# Patient Record
Sex: MALE | Race: BLACK OR AFRICAN AMERICAN | NOT HISPANIC OR LATINO | URBAN - METROPOLITAN AREA
[De-identification: names, ages, dates, MRNs, and addresses within clinical notes are randomized per-mention and may not be internally consistent; named-entity substitution may affect disease eponyms.]

---

## 2017-03-13 ENCOUNTER — TELEPHONE (OUTPATIENT)
Dept: BEHAVIORAL HEALTH | Facility: CLINIC | Age: 57
End: 2017-03-13

## 2017-03-13 NOTE — TELEPHONE ENCOUNTER
3/13/17 Received Rule 25 Assessment from Will Saavedra   ([phone]) referring to [facility]. Left VM for Will,   regarding, insurance and update demographic. Filed until   Information is updated. CAROLIN

## 2017-04-20 ENCOUNTER — OFFICE VISIT - HEALTHEAST (OUTPATIENT)
Dept: ADDICTION MEDICINE | Facility: CLINIC | Age: 57
End: 2017-04-20

## 2017-04-20 DIAGNOSIS — F11.20 OPIOID USE DISORDER, SEVERE, DEPENDENCE (H): ICD-10-CM

## 2017-04-20 DIAGNOSIS — F14.20 COCAINE USE DISORDER, SEVERE, DEPENDENCE (H): ICD-10-CM

## 2017-04-24 ENCOUNTER — OFFICE VISIT - HEALTHEAST (OUTPATIENT)
Dept: ADDICTION MEDICINE | Facility: CLINIC | Age: 57
End: 2017-04-24

## 2017-04-24 DIAGNOSIS — F11.20 OPIOID USE DISORDER, SEVERE, DEPENDENCE (H): ICD-10-CM

## 2017-04-25 ENCOUNTER — OFFICE VISIT - HEALTHEAST (OUTPATIENT)
Dept: ADDICTION MEDICINE | Facility: CLINIC | Age: 57
End: 2017-04-25

## 2017-04-25 DIAGNOSIS — F11.20 OPIOID USE DISORDER, SEVERE, DEPENDENCE (H): ICD-10-CM

## 2017-04-26 ENCOUNTER — AMBULATORY - HEALTHEAST (OUTPATIENT)
Dept: ADDICTION MEDICINE | Facility: CLINIC | Age: 57
End: 2017-04-26

## 2017-04-26 ENCOUNTER — OFFICE VISIT - HEALTHEAST (OUTPATIENT)
Dept: ADDICTION MEDICINE | Facility: CLINIC | Age: 57
End: 2017-04-26

## 2017-04-26 DIAGNOSIS — F11.20 OPIOID USE DISORDER, SEVERE, DEPENDENCE (H): ICD-10-CM

## 2017-05-01 ENCOUNTER — OFFICE VISIT - HEALTHEAST (OUTPATIENT)
Dept: BEHAVIORAL HEALTH | Facility: CLINIC | Age: 57
End: 2017-05-01

## 2017-05-01 DIAGNOSIS — I10 ESSENTIAL HYPERTENSION: ICD-10-CM

## 2017-05-01 DIAGNOSIS — F11.20 OPIOID USE DISORDER, SEVERE, DEPENDENCE (H): ICD-10-CM

## 2017-05-01 ASSESSMENT — MIFFLIN-ST. JEOR: SCORE: 1298.88

## 2017-05-02 ENCOUNTER — AMBULATORY - HEALTHEAST (OUTPATIENT)
Dept: ADDICTION MEDICINE | Facility: CLINIC | Age: 57
End: 2017-05-02

## 2017-05-02 ENCOUNTER — OFFICE VISIT - HEALTHEAST (OUTPATIENT)
Dept: ADDICTION MEDICINE | Facility: CLINIC | Age: 57
End: 2017-05-02

## 2017-05-02 DIAGNOSIS — F11.20 OPIOID USE DISORDER, SEVERE, DEPENDENCE (H): ICD-10-CM

## 2017-05-03 ENCOUNTER — OFFICE VISIT - HEALTHEAST (OUTPATIENT)
Dept: ADDICTION MEDICINE | Facility: CLINIC | Age: 57
End: 2017-05-03

## 2017-05-03 DIAGNOSIS — F11.20 OPIOID USE DISORDER, SEVERE, DEPENDENCE (H): ICD-10-CM

## 2017-05-04 ENCOUNTER — OFFICE VISIT - HEALTHEAST (OUTPATIENT)
Dept: ADDICTION MEDICINE | Facility: CLINIC | Age: 57
End: 2017-05-04

## 2017-05-04 DIAGNOSIS — F11.20 OPIOID USE DISORDER, SEVERE, DEPENDENCE (H): ICD-10-CM

## 2017-05-05 ENCOUNTER — AMBULATORY - HEALTHEAST (OUTPATIENT)
Dept: ADDICTION MEDICINE | Facility: CLINIC | Age: 57
End: 2017-05-05

## 2017-05-08 ENCOUNTER — AMBULATORY - HEALTHEAST (OUTPATIENT)
Dept: ADDICTION MEDICINE | Facility: CLINIC | Age: 57
End: 2017-05-08

## 2017-05-30 ENCOUNTER — AMBULATORY - HEALTHEAST (OUTPATIENT)
Dept: BEHAVIORAL HEALTH | Facility: CLINIC | Age: 57
End: 2017-05-30

## 2017-06-05 ENCOUNTER — COMMUNICATION - HEALTHEAST (OUTPATIENT)
Dept: BEHAVIORAL HEALTH | Facility: CLINIC | Age: 57
End: 2017-06-05

## 2017-06-06 ENCOUNTER — OFFICE VISIT - HEALTHEAST (OUTPATIENT)
Dept: BEHAVIORAL HEALTH | Facility: CLINIC | Age: 57
End: 2017-06-06

## 2017-06-06 DIAGNOSIS — F11.20 OPIOID USE DISORDER, SEVERE, DEPENDENCE (H): ICD-10-CM

## 2017-06-06 ASSESSMENT — MIFFLIN-ST. JEOR: SCORE: 1312.48

## 2018-01-15 ENCOUNTER — RECORDS - HEALTHEAST (OUTPATIENT)
Dept: ADMINISTRATIVE | Facility: OTHER | Age: 58
End: 2018-01-15

## 2018-01-16 ENCOUNTER — RECORDS - HEALTHEAST (OUTPATIENT)
Dept: ADMINISTRATIVE | Facility: OTHER | Age: 58
End: 2018-01-16

## 2018-01-19 ENCOUNTER — COMMUNICATION - HEALTHEAST (OUTPATIENT)
Dept: BEHAVIORAL HEALTH | Facility: CLINIC | Age: 58
End: 2018-01-19

## 2018-01-19 DIAGNOSIS — E11.9 DIABETES MELLITUS TYPE 2, INSULIN DEPENDENT (H): ICD-10-CM

## 2018-01-19 DIAGNOSIS — Z79.4 DIABETES MELLITUS TYPE 2, INSULIN DEPENDENT (H): ICD-10-CM

## 2018-01-19 DIAGNOSIS — F11.20 OPIOID USE DISORDER, SEVERE, DEPENDENCE (H): ICD-10-CM

## 2018-01-23 ENCOUNTER — HOSPITAL ENCOUNTER (OUTPATIENT)
Dept: CARDIOLOGY | Facility: CLINIC | Age: 58
Discharge: HOME | End: 2018-01-23

## 2018-01-23 ENCOUNTER — RECORDS - HEALTHEAST (OUTPATIENT)
Dept: ADMINISTRATIVE | Facility: OTHER | Age: 58
End: 2018-01-23

## 2018-01-23 ENCOUNTER — HOSPITAL ENCOUNTER (OUTPATIENT)
Dept: CARDIOLOGY | Facility: CLINIC | Age: 58
Discharge: HOME | End: 2018-01-23
Attending: INTERNAL MEDICINE

## 2018-01-23 DIAGNOSIS — R94.31 ABNORMAL EKG: ICD-10-CM

## 2018-01-23 LAB
CV STRESS CURRENT BP HE: NORMAL
CV STRESS CURRENT HR HE: 100
CV STRESS CURRENT HR HE: 108
CV STRESS CURRENT HR HE: 108
CV STRESS CURRENT HR HE: 110
CV STRESS CURRENT HR HE: 130
CV STRESS CURRENT HR HE: 134
CV STRESS CURRENT HR HE: 138
CV STRESS CURRENT HR HE: 140
CV STRESS CURRENT HR HE: 145
CV STRESS CURRENT HR HE: 88
CV STRESS CURRENT HR HE: 90
CV STRESS CURRENT HR HE: 91
CV STRESS CURRENT HR HE: 94
CV STRESS CURRENT HR HE: 95
CV STRESS CURRENT HR HE: 95
CV STRESS CURRENT HR HE: 98
CV STRESS CURRENT HR HE: 99
CV STRESS DEVIATION TIME HE: NORMAL
CV STRESS ECHO PERCENT HR HE: NORMAL
CV STRESS EXERCISE STAGE HE: NORMAL
CV STRESS FINAL RESTING BP HE: NORMAL
CV STRESS FINAL RESTING HR HE: 91
CV STRESS MAX HR HE: 146
CV STRESS MAX TREADMILL GRADE HE: 12
CV STRESS MAX TREADMILL SPEED HE: 2.5
CV STRESS PEAK DIA BP HE: NORMAL
CV STRESS PEAK SYS BP HE: NORMAL
CV STRESS PHASE HE: NORMAL
CV STRESS PROTOCOL HE: NORMAL
CV STRESS RESTING PT POSITION HE: NORMAL
CV STRESS RESTING PT POSITION HE: NORMAL
CV STRESS ST DEVIATION AMOUNT HE: NORMAL
CV STRESS ST DEVIATION ELEVATION HE: NORMAL
CV STRESS ST EVELATION AMOUNT HE: NORMAL
CV STRESS TEST TYPE HE: NORMAL
CV STRESS TOTAL STAGE TIME MIN 1 HE: NORMAL
ECHO EJECTION FRACTION ESTIMATED: 60 %
STRESS ECHO BASELINE BP: NORMAL
STRESS ECHO BASELINE HR: 88
STRESS ECHO CALCULATED PERCENT HR: 90 %
STRESS ECHO LAST STRESS BP: NORMAL
STRESS ECHO LAST STRESS HR: 145
STRESS ECHO POST ESTIMATED WORKLOAD: 5.2
STRESS ECHO POST EXERCISE DUR MIN: 3
STRESS ECHO POST EXERCISE DUR SEC: 30
STRESS ECHO TARGET HR: 139

## 2021-05-30 VITALS — WEIGHT: 125 LBS | BODY MASS INDEX: 20.83 KG/M2 | HEIGHT: 65 IN

## 2021-05-31 VITALS — WEIGHT: 128 LBS | HEIGHT: 65 IN | BODY MASS INDEX: 21.33 KG/M2

## 2021-06-10 NOTE — PROGRESS NOTES
Addiction Services - Initial Services Plan      Name:  Delfin Quiros       :  1960        MRN:  [id number]    Goal Methods   1.  Acceptance of chemical dependency and mental illness as a disease. A.  Comply with all med/psych recommendations  B.  Complete preliminary interviews  C.  Attend all program functions  D.  Attend all individual counseling sessions  E.  Read all assigned literature  F.  Complete psychological testing as assigned  G.  Particpate in any necessary consultations     2.  Acceptance of my need and ability to change A.  Complete written workbook assignments on MICD  B.  Participate in conferences (P.O., , family)  C.  Participate in 12 Step/support groups  D.  Actively participate in treatment planning and reviews  E.  Complete all assignments given or recommended on Treatment Plan  F.  Present Drug History/Peer Assessment with peer group  G.  Complete 10th Step Inventory daily   3.  Acceptance of staff recommendations as a means to my recovery A.  Participate in all interviews for Continuum of Care Plans  B.  Familiarize self with 12 Step Recovery Program and how this applies to your day-to-day behavior  C.  Demonstrate a working knowledge of AA steps of recovery  D.  Obtain a sponsor     Patient describes their immediate need:  Bus card for transportation  Are there any immediate Safety Needs such as (physical, stability, mobility):  None    Immediate Health Needs and Plan:    Follow diabetes protocol/medication    Vulnerable Adult:  No    [x] Continue Current Medications for: diabetes, high blood pressure, anxiety,suboxone and pain  [] Request Consult for:  [] Notify Attending Physician about:  [] Other:      Issues to be addressed in the first sessions:    Become acquainted with group norms and other group members.   Set up time to meet 1:1 with primary counselor.     Patient strengths and needs:  Strengths: history of sobriety, good at giving advice, good with kids, kind,  caring, sharing, compassionate.     Needs: self-awareness about people and the potential to be taken advantage of by them.     Plan for patient for time between intake and completion of the treatment plan:  Remain abstinent from any illicit substance/alcohol use, and start group on 4/24/17.   Participate in all treatment activities and assignments.     Staff Members' Titles authorized to Initiate Services are:    Director of Behavioral Service    Clinical Director of Chemical Dependency    Primary Counselor    MI/KVNG [place]. Counselor        Nursing Staff    Vulnerable Adult Review  [x] Review of the facility Abuse Prevention plan was reviewed with the patient  [x] No individual abuse plan is necessary  [] In addition to the facility Abuse Prevention plan, an Individual Abuse Plan will be put in place    I understand these goals to be the Treatment Goals of the Program, and I agree to the stated Methods in attempting to accomplish these goals.    Patient Signature:  _________________________Date:  4/20/2017      Staff Name/Title:  ANGEL Espana    Date:  4/20/2017  Time: 3:01 PM

## 2021-06-10 NOTE — PROGRESS NOTES
Correct pharmacy verified with patient and confirmed in snapshot? [x] yes []no    Medications Phoned  to Pharmacy [] yes [x]no  Name of Pharmacist:  List Medications, including dose, quantity and instructions      Medication Prescriptions given to patient   [] yes  [x] no   List the name of the drug the prescription was written for.      Medications ordered this visit were e-scribed.  Verified by order class [x] yes  [] no  Suboxone    Medication changes or discontinuations were communicated to patient's pharmacy:  [] yes  [x] no  Pharmacist Spoke With:     UA collected [x] yes  [] no    [place] Prescription Monitoring Program Reviewed? [x] yes  [] no    Referrals/Labs were made to: None    Completed Charge Capture?  [x] yes  [] no    Future appointment was made: [x] yes  [] no  5/15/17  Dictation completed at time of chart check: [] yes  [x] no    I have checked the documentation for today s encounters and the above information has been reviewed and completed.
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
## 2021-06-10 NOTE — PROGRESS NOTES
Weekly Progress Note  Delfin Quiros  1960  [id number]      D) Pt attended 3 groups  this week with 1absences. A) Staff facilitated groups and reviewed tx progress. Assessed for [facility]. R) No VAP needed at this time. Pt working on the following dimensions:  Dimension #1 - Withdrawal Potential -Risk 0, No concern.  Client recently complete inpatient treatment. Client reports his last use of crack and heroin was 3/29/17. Client denies any current withdrawal symptoms. Client shows no physical signs or symptoms of intoxication or withdrawal.   Dimension #2 - Biomedical - Risk 1, Mild concern.  Client reports health concerns including diabetes and Hepatitis C. Client does not report a PCP, but his medical concerns appear to be managed at this time. Client appears able to get his medical needs addressed and met as necessary.   Dimension #3 - Emotional , Behavioral and Cognitive - Risk 1, Mild concern.  Client has mental health diagnoses of anxiety and depression. Client does not currently have mental health services, but does have medications that he takes to manage his mental health. Client reportedly struggles with anger issues, but appears stable at this time. Client denies any current suicidal or homicidal thoughts or plans.   Dimension #4 - Treatment Resistance -Risk 0, No concern.  Client verbalizes a desire to be in treatment, and to get help in order to change. Client appears to be genuinely motivated at this time. Client was calm and cooperative throughout this intake.   Dimension #5 - Relapse Potential - Risk 3, Serious concern.  Client reports a number of treatments in his life, with 3 of those being in the last 5 years. This may indicate a lack of coping skills to prevent relapse outside of a controlled environment. Client reports that his longest period of sobriety was about 6.5 years, and so it appears that the client does possess some coping skills, but it is unclear how often the client applies these skills  in his daily life currently. Client's anger issues may indicate an increased risk of relapse at this time as the client past use may have been a result of his inability to cope with daily stress.   Dimension #6 - Recovery Environment - Risk 3, Serious concern.  Client reports that he is currently living with a long-time friend, as the client is otherwise homeless. Client reports that this friend is very supportive of him, and is also sober. Client reports that his friends and family are supportive of his recovery currently, but he does state that his old using friends could be a threat to his sobriety. Client denies any current legal involvement, but does report 1 past charge for possession. Client reports no current support group participation as he was just released from inpatient on this date, but states that he plans to get involved multiple times per week. Client does endorse some hobbies that he enjoys, but it does not appear that the client has been engaging in those activities when he was using prior to treatment. Client is also unemployed, and so appears to lack structured and meaningful activity to fill his time.  T) Patient educated on Relapse Patient has completed 9 of 84 program hours at this time. Projected discharge date is 7/20/17. Current discharge plan is TBD     ANGEL Pagan        Psycho-Educational Curriculum  Date Attended  Psycho-Educational Curriculum  Date Attended    Acceptance   Shame/Guilt     1st Step   Anger/Rage     Affirmations   Mental Health     Automatic Negative Thoughts   Anxiety     Cross Addiction   Co-Occurring Disorders     Stages of Change   Annmarie/Bipolar     Relapse   Trauma      Addictive Thoughts   Victim Identity     Coping Skills   Sober Structure     Relapse Prevention   Continuum of Care     Medical Aspects   Non-12 Step Support     Brain/Neurotransmitters   Priorities     Medication Compliance   Spirituality     DENZEL Alcohol/Drug Research   Weekend Planner    "  Physical Health   Educational Videos     Post Acute Withdrawal   1st Step     Pregnancy and Drug Use   2nd Step     Sexual Health   Assertive Communication     Short-Term/Long-Term Effects   My name is Milton UNGER    Relationships   Cross Addiction     Assertive Communication   God As We Understood Him     Boundaries   HBO Relapse     Codependence    HBO What Is Addiction     Defense Mechanisms    Medical Aspects 1     Family Roles   Medical Aspects 2     Goodbye Letter   [employer]: Stress     Intimacy   PBS Depression Out of the Shadows     Needs/Dealbreakers in Relationships   The Anonymous People    Socialization Skills   [place]     Feelings   Pb Scott \"Highjacked Brain\"    ABC Model of Emotion   Johnny Conn Humor in [place]    Grief and Loss   The Mindfulness Movie    Healthy vs. Unhealthy Feelings   Milton UNGER documentary     Meditation/Mindfulness       Overconfidence/Complacency       Resentments       Stress         "

## 2021-06-10 NOTE — PROGRESS NOTES
Intake Note:   D) Delfin Quiros is a 57 y.o.  single Black or  male who is referred to DOP via [facility] with funding from Medicare/[facility] Funding. Patient orientated x 3. Patient meets criteria for Opioid Use Disorder, Severe (F11.20), Stimulant Use Disorder, Cocaine, Severe (F14.20).   Patient appears appropriate for DOP.     A)Completed intake assessment; preliminary paperwork; counselor & supervisor license number and contact info., Patient Bill of Rights, , DOP rules/regulations, , Abuse Prevention Plan,, confidentiality & HIPPA policies, , grievance procedure,, presented ROIs, , TB & HIV/AIDS policies & resources, and Vulnerable Adult policy, .   Conducted Vulnerable Adult Assessment yes .     R)No special Vulnerable Adult needed at this time. .   Patient signed and agreed to counselor & supervisor license number and contact info., Patient Bill of Rights, , DOP rules/regulations, , Abuse Prevention Plan,, confidentiality & HIPPA policies, , grievance procedure,, presented ROIs, TB & HIV/AIDS policies & resources, and Vulnerable Adult policy. Patient scored lower risk on PANSI screen.   Dimension #1 - Withdrawal Potential -Risk 0, No concern.  Client recently complete inpatient treatment. Client reports his last use of crack and heroin was 3/29/17. Client denies any current withdrawal symptoms. Client shows no physical signs or symptoms of intoxication or withdrawal.   Dimension #2 - Biomedical - Risk 1, Mild concern.  Client reports health concerns including diabetes and Hepatitis C. Client does not report a PCP, but his medical concerns appear to be managed at this time. Client appears able to get his medical needs addressed and met as necessary.   Dimension #3 - Emotional , Behavioral and Cognitive - Risk 1, Mild concern.  Client has mental health diagnoses of anxiety and depression. Client does not currently have mental health services, but does have  medications that he takes to manage his mental health. Client reportedly struggles with anger issues, but appears stable at this time. Client denies any current suicidal or homicidal thoughts or plans.   Dimension #4 - Treatment Resistance -Risk 0, No concern.  Client verbalizes a desire to be in treatment, and to get help in order to change. Client appears to be genuinely motivated at this time. Client was calm and cooperative throughout this intake.   Dimension #5 - Relapse Potential - Risk 3, Serious concern.  Client reports a number of treatments in his life, with 3 of those being in the last 5 years. This may indicate a lack of coping skills to prevent relapse outside of a controlled environment. Client reports that his longest period of sobriety was about 6.5 years, and so it appears that the client does possess some coping skills, but it is unclear how often the client applies these skills in his daily life currently. Client's anger issues may indicate an increased risk of relapse at this time as the client past use may have been a result of his inability to cope with daily stress.   Dimension #6 - Recovery Environment - Risk 3, Serious concern.  Client reports that he is currently living with a long-time friend, as the client is otherwise homeless. Client reports that this friend is very supportive of him, and is also sober. Client reports that his friends and family are supportive of his recovery currently, but he does state that his old using friends could be a threat to his sobriety. Client denies any current legal involvement, but does report 1 past charge for possession. Client reports no current support group participation as he was just released from inpatient on this date, but states that he plans to get involved multiple times per week. Client does endorse some hobbies that he enjoys, but it does not appear that the client has been engaging in those activities when he was using prior to treatment.  Client is also unemployed, and so appears to lack structured and meaningful activity to fill his time.     T) Explained counselor & supervisor license number and contact info., Patient Bill of Rights, , DOP rules/regulations, , Abuse Prevention Plan,, confidentiality & HIPPA policies, , grievance procedure,, presented ROIs, , TB & HIV/AIDS policies & resources, and Vulnerable Adult policy.    Sherrell Fox [facility] WilliamsburgDANNIELLE  4/20/2017, 3:02 PM

## 2021-06-10 NOTE — PROGRESS NOTES
[facility]   Mental Health and Addiction Care   [facility], [facility], and [facility]    [phone] or [phone]   Master Plan   Client Name:  Delfin Quiros   MRN: [id number]    Counselor: ANGEL Pagan     Title:  Dimension #1 - Withdrawal Potential -Risk 0, No concern.      Plan Date:   4/26/2017  Diagnosis:     Patient Active Problem List   Diagnosis     Opioid use disorder, severe, dependence     Hypertension     Type 2 diabetes mellitus with diabetic polyneuropathy, with long-term current use of insulin     MDD (major depressive disorder), recurrent episode, moderate     Hepatitis C     Cocaine use disorder, mild, abuse     Pre-ulcerative calluses     Tobacco use disorder     Orthostatic hypotension     Opioid withdrawal     Severe opioid use disorder     Callus of foot     Hammer toes of both feet     Severe protein-calorie malnutrition     Nocturia     Liver mass      Problem:Client recently complete inpatient treatment. Client reports his last use of crack and heroin was 3/29/17. Client denies any current withdrawal symptoms. Client shows no physical signs or symptoms of intoxication or withdrawal.Client currently taking suboxone for withdrawal opiate replacement.     Goal: Begin Date: 4/26/2017 Target Date: 7/20/17  Maintain abstinence throughout  Outpatient Treatment in order to avoid experiencing withdrawal symptoms and to meet OP expectations.   Method 1: Begin Date:4/26/2017 Target Date: 7/20/17 Date Completed:   Attend OP groups as directed and share thoughts, feelings and urges to use, as well as sober supports with staff and peers in order to maintain awareness of details shaping your recovery process.     Title:  Dimension #2 - Biomedical - Risk 1, Mild concern.     Plan Date:   4/26/2017   Diagnosis:     Patient Active Problem List   Diagnosis     Opioid use disorder, severe, dependence     Hypertension     Type 2 diabetes mellitus with diabetic polyneuropathy, with  long-term current use of insulin     MDD (major depressive disorder), recurrent episode, moderate     Hepatitis C     Cocaine use disorder, mild, abuse     Pre-ulcerative calluses     Tobacco use disorder     Orthostatic hypotension     Opioid withdrawal     Severe opioid use disorder     Callus of foot     Hammer toes of both feet     Severe protein-calorie malnutrition     Nocturia     Liver mass      Problem:Client reports health concerns including diabetes and Hepatitis C. Client does not report a PCP, but his medical concerns appear to be managed at this time. Client appears able to get his medical needs addressed and met as necessary.    Goal: Begin Date: 4/26/2017 Target Date: 7/20/17  Practice living a healthy lifestyle on a daily basis with proper rest, nutrition and exercise. Client to make an appointment for a physical asap  Method 1: Begin Date:4/26/2017 Target Date: 7/20/17 Date Completed:   Continue to follow recommendations from your personal care provider regarding medical issues. Inform staff immediately of any changes in your health that may affect your active participation in group therapy or attendance.    Is Nicotine dependence indicated on the assessment? NO  Method 2: Begin Date:4/26/2017 Target Date: 7/20/17 Date Completed:  Staff will provide client with information on tobacco cessation,and tools for quitting.      Title: Dimension #3 - Emotional , Behavioral and Cognitive - Risk 1, Mild concern.     Plan Date:   4/26/2017   Diagnosis:     Patient Active Problem List   Diagnosis     Opioid use disorder, severe, dependence     Hypertension     Type 2 diabetes mellitus with diabetic polyneuropathy, with long-term current use of insulin     MDD (major depressive disorder), recurrent episode, moderate     Hepatitis C     Cocaine use disorder, mild, abuse     Pre-ulcerative calluses     Tobacco use disorder     Orthostatic hypotension     Opioid withdrawal     Severe opioid use disorder      Callus of foot     Hammer toes of both feet     Severe protein-calorie malnutrition     Nocturia     Liver mass      Problem:Client has mental health diagnoses of anxiety and depression. Client does not currently have mental health services, but does have medications that he takes to manage his mental health. Client reportedly struggles with anger issues, but appears stable at this time. Client denies any current suicidal or homicidal thoughts or plans.    Goal: Begin Date: 4/26/2017 Target Date: 7/20/17   To treat mental health effectively while attending treatment in order to increase your ability to meet goals and treatment expectations Make an appointment in the mental health clinic for a Diagnostic Assessment and follow up therapy and provider appointment for medication management.    Method 1: Begin Date:4/26/2017 Target Date: 7/20/17 Date Completed:   Begin to keep a daily schedule  for appointments , events of the day and schedule sober activities. . Reflecting on this daily to build continued sober support and wellbeing. . Report in group how this is beneficial.  Method 2: Begin Date:4/26/2017 Target Date: 7/20/17  Date Completed:   Identify 3 personal traits you like about yourself and 3 that you would like to see changes in. Develop a plan to initiate those changes. What will need to happen for these goals to be successful? Share this with counselor.        Title: Dimension #4 - Treatment Resistance -Risk 0, No concern.       Plan Date:   4/26/2017     Diagnosis:     Patient Active Problem List   Diagnosis     Opioid use disorder, severe, dependence     Hypertension     Type 2 diabetes mellitus with diabetic polyneuropathy, with long-term current use of insulin     MDD (major depressive disorder), recurrent episode, moderate     Hepatitis C     Cocaine use disorder, mild, abuse     Pre-ulcerative calluses     Tobacco use disorder     Orthostatic hypotension     Opioid withdrawal     Severe opioid use  "disorder     Callus of foot     Hammer toes of both feet     Severe protein-calorie malnutrition     Nocturia     Liver mass      Problem:Client verbalizes a desire to be in treatment, and to get help in order to change. Client appears to be genuinely motivated at this time.client has been through several treatment with some long term sobriety. Lacks impulse control and insight to remain abstinent.         Goal: Begin Date: 4/26/2017 Target Date: 7/20/17  To follow through with intentions to treat chemical dependency concerns while meeting OP treatment expectations in order to graduate successfully from our program.   Method 1: Begin Date:4/26/2017 Target Date: 6/1/17 Date Completed:   Complete 1st Step (Use History and Consequences) assignment while in Phase I of treatment and present to peers in group. Reflect on feedback received from peers and report findings to staff.   Method 2: Begin Date:4/26/2017 Target Date: 7/20/17  Date Completed:   Complete all written assignments as assigned by staff including your \"goodbye letter\" and \"relapse prevention plan\" prior to graduation.  Contact staff with questions or concerns about written and oral assignments while attending group therapy.  Method 3: Begin Date:4/26/2017 Target Date: 7/20/17 Date Completed:   If for any reason you cannot attend group therapy or you will be tardy, contact staff as soon as possible at [phone]. Three unexcused absences  is considered voluntary discharge from the outpatient program.       Title: Dimension #5 - Relapse Potential - Risk 3, Serious concern.    Plan Date:   4/26/2017   Diagnosis:     Patient Active Problem List   Diagnosis     Opioid use disorder, severe, dependence     Hypertension     Type 2 diabetes mellitus with diabetic polyneuropathy, with long-term current use of insulin     MDD (major depressive disorder), recurrent episode, moderate     Hepatitis C     Cocaine use disorder, mild, abuse     Pre-ulcerative calluses "     Tobacco use disorder     Orthostatic hypotension     Opioid withdrawal     Severe opioid use disorder     Callus of foot     Hammer toes of both feet     Severe protein-calorie malnutrition     Nocturia     Liver mass      Problem:Client reports that his longest period of sobriety was about 6.5 years, and so it appears that the client does possess some coping skills, but it is unclear how often the client applies these skills in his daily life currently. Client's anger issues may indicate an increased risk of relapse at this time as the client past use may have been a result of his inability to cope with daily stress. Client reports a number of treatments in his life, with 3 of those being in the last 5 years. This may indicate a lack of coping skills to prevent relapse outside of a controlled environment.     Goal: Begin Date: 4/26/2017Target Date: 7/20/17  To deal effectively with relapse triggers and stressors while building coping skills in order to handle life events without resorting to drug/alcohol use.   Method 1: Begin Date:4/26/2017 Target Date: 7/20/17 Date Completed:   Participate in OP group check-ins consistently as way of increasing self-awareness and connecting honestly with staff and peers.   Method 2: Begin Date:4/26/2017 Target Date: 7/20/17 Date Completed:   Develop a list of 7 triggers to your use and identify 10 coping skills you can use to arrest the urge to use. Submit to counselor when finished.    Method 3: Begin Date:4/26/2017 Target Date: 7/20/17Date Completed:   Report any relapses, if any, on any substances of abuse to staff immediately.        Title: Dimension #6 - Recovery Environment - Risk 3, Serious concern.    Plan Date:   4/26/2017   Diagnosis:     Patient Active Problem List   Diagnosis     Opioid use disorder, severe, dependence     Hypertension     Type 2 diabetes mellitus with diabetic polyneuropathy, with long-term current use of insulin     MDD (major depressive  disorder), recurrent episode, moderate     Hepatitis C     Cocaine use disorder, mild, abuse     Pre-ulcerative calluses     Tobacco use disorder     Orthostatic hypotension     Opioid withdrawal     Severe opioid use disorder     Callus of foot     Hammer toes of both feet     Severe protein-calorie malnutrition     Nocturia     Liver mass      Problem: Client reports that he is currently living with a long-time friend, as the client is otherwise homeless. Client reports that this friend is very supportive of him, and is also sober. Client reports that his friends and family are supportive of his recovery currently, but he does state that his old using friends could be a threat to his sobriety. Client denies any current legal involvement, but does report 1 past charge for possession. Client reports no current support group participation as he was just released from inpatient on this date, but states that he plans to get involved multiple times per week. Client does endorse some hobbies that he enjoys, but it does not appear that the client has been engaging in those activities when he was using prior to treatment. Client is also unemployed, and so appears to lack structured and meaningful activity to fill his time. Location of treatment here at [facility] may pose a risk to his recovery as he is assiociated with people in the area where use is abundant.     Goal: Begin Date: 4/26/2017 Target Date: 7/20/17  To build meaningful structure into your weekly schedule by attending specific recovery activities on a daily basis   Method 1: Begin Date:4/26/2017 Target Date: 7/20/17 Date Completed:   Find 2 sober support groups you feel comfortable attending on a weekly basis and inform counselor how these meetings are impacting you.Obtain a sponsor before 2nd phase of treatment.    Method 2: Begin Date:4/26/2017 Target Date: 7/20/17  Date Completed:   Develop a Bucket list. What activities would you like to take part in. Set  goals in intervals.  such as 3 month, 6 month,1 year, 3 years and 5 years, what barriers do you identify at this time for these goals to happen.   Method 3: Begin Date:4/26/2017 Target Date: 7/20/17  Date Completed:   Invite a family member or concerned other who is supportive of your recovery to meet for a family session with your primary counselor, in the effort to help them understand addiction and the causes and to gain knowledge of self care for themselves and for your recovery.    By signing this document, I am acknowledging that I was actively and directly involved in the development of my treatment plan.   I have been a participant in the creation of my individual treatment plan.       Client Signature_________________________________________         Date__________________         Staff Signature ANGEL Pagan

## 2021-06-10 NOTE — PROGRESS NOTES
Weekly Progress Note  Delfin Quiros  1960  [id number]      D) Pt attended 3 groups  this week with 1absences. There is some concern that client is using . A staff member reported seeing him involved in a drug deal on [address], 2 days ago. Client was presented yesterday with a request to submit a Ua to the lab before 1:30 pm on 5/4/17 however the client never presented to the lab. This writer called the client's phone at 12/pm today and left a message requesting that he come to the hospital to submit a UA today and a return phone call.    A) Staff facilitated groups and reviewed tx progress. Assessed for [facility]. R) No VAP needed at this time. Pt working on the following dimensions:  Dimension #1 - Withdrawal Potential -Risk 0, No concern.  Client recently complete inpatient treatment. Client reports his last use of crack and heroin was 3/29/17. Client denies any current withdrawal symptoms. Client shows no physical signs or symptoms of intoxication or withdrawal.   Dimension #2 - Biomedical - Risk 1, Mild concern.  Client reports health concerns including diabetes and Hepatitis C. Client does not report a PCP, but his medical concerns appear to be managed at this time. Client appears able to get his medical needs addressed and met as necessary.   Dimension #3 - Emotional , Behavioral and Cognitive - Risk 1, Mild concern.  Client has mental health diagnoses of anxiety and depression. Client does not currently have mental health services, but does have medications that he takes to manage his mental health. Client reportedly struggles with anger issues, but appears stable at this time. Client denies any current suicidal or homicidal thoughts or plans.   Dimension #4 - Treatment Resistance -Risk 0, No concern.  Client verbalizes a desire to be in treatment, and to get help in order to change. Client appears to be genuinely motivated at this time. Client was calm and cooperative throughout this intake.Client  appears to be very sedated while in group.    Dimension #5 - Relapse Potential - Risk 3, Serious concern.  Client reports a number of treatments in his life, with 3 of those being in the last 5 years. This may indicate a lack of coping skills to prevent relapse outside of a controlled environment. Client reports that his longest period of sobriety was about 6.5 years, and so it appears that the client does possess some coping skills, but it is unclear how often the client applies these skills in his daily life currently. Client's anger issues may indicate an increased risk of relapse at this time as the client past use may have been a result of his inability to cope with daily stress.   Dimension #6 - Recovery Environment - Risk 3, Serious concern.  Client reports that he is currently living with a long-time friend, as the client is otherwise homeless. Client reports that this friend is very supportive of him, and is also sober. Client reports that his friends and family are supportive of his recovery currently, but he does state that his old using friends could be a threat to his sobriety. Client denies any current legal involvement, but does report 1 past charge for possession. Client reports no current support group participation as he was just released from inpatient on this date, but states that he plans to get involved multiple times per week. Client does endorse some hobbies that he enjoys, but it does not appear that the client has been engaging in those activities when he was using prior to treatment. Client is also unemployed, and so appears to lack structured and meaningful activity to fill his time.  T) Patient educated on Relapse Patient has completed 18 of 84 program hours at this time. Projected discharge date is 7/20/17. Current discharge plan is ANGEL Gunderson        Psycho-Educational Curriculum  Date Attended  Psycho-Educational Curriculum  Date Attended    Acceptance   Shame/Guilt   "   1st Step   Anger/Rage     Affirmations   Mental Health     Automatic Negative Thoughts   Anxiety     Cross Addiction   Co-Occurring Disorders     Stages of Change   Annmarie/Bipolar     Relapse   Trauma      Addictive Thoughts   Victim Identity     Coping Skills   Sober Structure     Relapse Prevention   Continuum of Care     Medical Aspects   Non-12 Step Support     Brain/Neurotransmitters   Priorities     Medication Compliance   Spirituality     DENZEL Alcohol/Drug Research   Weekend Planner     Physical Health   Educational Videos     Post Acute Withdrawal   1st Step     Pregnancy and Drug Use   2nd Step     Sexual Health   Assertive Communication     Short-Term/Long-Term Effects   My name is Milton UNGER    Relationships   Cross Addiction     Assertive Communication   God As We Understood Him     Boundaries   HBO Relapse     Codependence    HBO What Is Addiction     Defense Mechanisms    Medical Aspects 1     Family Roles   Medical Aspects 2     Goodbye Letter   [employer]: Stress     Intimacy   PBS Depression Out of the Shadows     Needs/Dealbreakers in Relationships   The Anonymous People    Socialization Skills   [place]     Feelings   Pb Scott \"Highjacked Brain\"    ABC Model of Emotion   Johnny Conn Humor in [place]    Grief and Loss   The Mindfulness Movie    Healthy vs. Unhealthy Feelings   Milton UNGER documentary     Meditation/Mindfulness       Overconfidence/Complacency       Resentments       Stress         "

## 2021-06-10 NOTE — PROGRESS NOTES
Patient called the clinic to inform them that he is not able to make it to therapy today because he is admitted at [facility] at [address];which is addiction services area. No further comment was given.   Performed and [facility] 5/30/2017

## 2021-06-10 NOTE — PROGRESS NOTES
Delfin Quiros attended 3 hours of group therapy today. The patient was introduced to the group today.     4/25/2017 8:15 AM Deidra Coulter

## 2021-06-15 PROBLEM — M20.41 HAMMER TOES OF BOTH FEET: Status: ACTIVE | Noted: 2017-04-03

## 2021-06-15 PROBLEM — L84 CALLUS OF FOOT: Status: ACTIVE | Noted: 2017-04-03

## 2021-06-15 PROBLEM — E43 SEVERE PROTEIN-CALORIE MALNUTRITION (H): Status: ACTIVE | Noted: 2017-04-03

## 2021-06-15 PROBLEM — R35.1 NOCTURIA: Status: ACTIVE | Noted: 2017-04-07

## 2021-06-15 PROBLEM — M20.42 HAMMER TOES OF BOTH FEET: Status: ACTIVE | Noted: 2017-04-03

## 2021-07-04 NOTE — PROGRESS NOTES
"Rule 31 by Sherrell Fox LADC at 2017  3:00 PM     Author: Sherrell Fox LADC Service: Addiction Care Author Type: Licensed Alcohol and Drug Counselor    Filed: 2017  8:01 AM Encounter Date: 2017 Status: Signed    : Sherrell Fox LADC (Licensed Alcohol and Drug Counselor)         [facility] Assessment Summary  Date: 2017        : ANGEL Espana    Name: Delfin Quiros  Address: [address] [place] [address]    Phone: [phone] (home)   Referral Source: [facility]  : 1960  Age: 57 y.o.  Race/Ethnicity: Black or   Marital Status: Single  Employment: Unemployed                                                                                                                       Level of Education: GED                Socio-economic (yearly Income) Status: Social Security.   Sexual Orientation: Heterosexual       Last 4 digits of Social Security: 6010    Reason for seeking services    Client is here for treatment to \"get his life back.\" Client reports a history of sobriety in the past, and verbalizes a desire for support at this time.     Dimension I Acute intoxication/Withdrawal Potential:    Symptomology (past 12 months, check all that apply)  [x]Increased tolerance, [x]passing out, [x] binges, [x]AM use, [x]weekly intoxication, [] decreased tolerance, [] blackouts, []secretive use, [x]preoccupation, []protecting supply, [x] hurried ingestion, [x]medicinal use, []using alone, []repeated family or social problems, [x] mood swings, []loss of control, []family history of addiction    Observed or reported (withdrawal symptoms, check all that apply) Client denies all withdrawal symptoms.   []SWEATING (RAPID PULSE), [] NAUSEA/VOMITING, []SHAKY/JITTERY/TREMORS, []DIZZINESS, []UNABLE TO SLEEP, []SEIZURES, []AGITATION, [] DIARRHEA, []HEADACHE, []DIMINISHED APPETITE,[]FATIGUE/EXTREMELY TIRED, []HALLUCINATIONS, []SAD /DEPRESSED " FEELING, []FEVER,[]MUSCLE ACHES, []UNABLE TO EAT, []VIVID /UNPLEASANT DREAMS, []PSYCHOSIS, []IRRITABILITY, []CONFUSED/DISRUPTED SPEECH, []SENSITIVITY TO NOISE, [] ANXIETY/WORRIED,[]HIGH BLOOD PRESSURE          Chemical use most recent 12 months outside a facility and other significant use history (client self-report)  Primary Drug Used  Age of First Use  Most Recent Pattern of Use and Duration    Date of last use and time, if needed  Withdrawal Potential? Requiring special care  Method of use   (oral, smoked, snort, IV, etc)    Alcohol  16 2x per month- 1 drink each time.  3/25/17  oral   Marijuana/Hashish  12 A couple times in the past year- 2 hits each time.  3/29/17  smoking   Cocaine/Crack  18 Crack- 1/2 gram daily since 2016 3/29/17  smoking   Meth/Amphetamines   Denies      Heroin  17 1/2 gram daily since 2016 3/29/17  snorting   Other Opiates/Synthetics   Denies      Inhalants   Denies      Benzodiazepines   Denies      Hallucinogens   Denies      Barbiturates/Sedatives/Hypnotics   Denies      Over-the-Counter Drugs   Denies      Other   Denies      Nicotine  15 1/2- 1 pack daily 17  Smoking.        Dimension I Risk Ratin- No Concern.   Reason Risk Rating Assigned: Client recently complete inpatient treatment. Client reports his last use of crack and heroin was 3/29/17. Client denies any current withdrawal symptoms. Client shows no physical signs or symptoms of intoxication or withdrawal.     Dimension II Biomedical Conditions:    Any known health conditions: Yes[x]/No[]    Ever previously treated/diagnosed with any eating disorder?  YES []/[x] NO  Explain:NA    List Health Concerns/Conditions Reported: Diabetes, and Hepatitis C    Are Health Concerns/Conditions being treated? YES[]/NO [x]  By Whom? No PCP reported at this time.   Are you pregnant: Yes[]/No[x]  OB Care Received: Yes[]/No[x]       CPS Call needed: Yes[]/No[x]    Dimension II Risk Ratin- Mild Concern.   Reason  Risk Rating Assigned: Client reports health concerns including diabetes and Hepatitis C. Client does not report a PCP, but his medical concerns appear to be managed at this time. Client appears able to get his medical needs addressed and met as necessary.     Dimension III Emotional/Behavioral/Cognitive:    Oriented to person, place, time, situation? YES [x]/ NO []   Current Mental Health Services: YES []/ NO [x]  Past Hospitalization for MH or psychiatric problems: YES[] / NO[x]  How many Hospitalizations: 0   Last Hospitalization; date and location: NA      Past or Current Issues with Gambling (Explain): Denies.   Prior Treatment for Gambling: YES[] / NO[x]    MH Diagnoses:    Depression, anxiety.   Psychiatrist: NA     Clinic: NA    Current Psychotropic Medications:    amLODIPine (NORVASC) 10 MG tablet   aspirin 81 mg chewable tablet   buprenorphine-naloxone (SUBOXONE SL FILM) 8-2 mg Film per sublingual film   citalopram (CELEXA) 20 MG tablet   ferrous sulfate 325 (65 FE) MG tablet   gabapentin (NEURONTIN) 300 MG capsule   insulin aspart (NOVOLOG) 100 unit/mL injection   lisinopril (PRINIVIL,ZESTRIL) 40 MG tablet   multivitamin therapeutic (THERAGRAN) tablet   NOVOLOG FLEXPEN 100 unit/mL injection pen   QUEtiapine (SEROQUEL) 50 MG tablet   traZODone (DESYREL) 50 MG tablet         Taking medications as prescribed:  YES[x] / NO[]    Medications Helpful: YES[x] / NO[]    Current Suicidal Ideation: YES[] / NO[x]  If yes, any plan?  YES[] / NO[x]  What is plan?:   None    Previous Suicide Attempts?  YES[] / NO[x]  Explain: Denies.      Current Homicidal Ideation: YES[]/NO[x] If yes, any plan? YES[]/NO[x]  What is plan?: None    Previous Homicide Attempts? YES[]/NO[x]Explain: Denies    Suicidal/Homicidal Ideation in last 30 days? YES[]/NO [x] (Explain)  Denies.     Family history of substance and/or mental health diagnosis/issues?  YES[x]/NO [] (Explain)  Client reports that he has 2 brothers who have schizophrenia      History of abuse (Physical, Emotional, Sexual)? YES[]/NO [x] (Explain)  Client denies.       Dimension III Risk Ratin- Mild Concern.   Reason Risk Rating Assigned: Client has mental health diagnoses of anxiety and depression. Client does not currently have mental health services, but does have medications that he takes to manage his mental health. Client reportedly struggles with anger issues, but appears stable at this time. Client denies any current suicidal or homicidal thoughts or plans.     Dimension IV Readiness to Change:    Mandated, or coerced into assessment or treatment:  YES[] / NO  [x]  Does client feel there is a problem:  YES[x] / NO[]  Verbalization of need/desire to change:  YES [x]/ NO[]    Impression of : (Check all that apply):  [x]Cooperative, [x] genuinely motivated, []ambivalent about change, []minimal awareness, [] low motivation, []minimally cooperative, []non-compliant, []overtly hostile, []unwilling to explore change    Are there any spiritual, cultural, or other special needs to be addressed for client to be successful in treatment? Yes[]/No  [x]   Explain: Denies.      Hazardous activities engaged in which placed self or others in danger (i.e., operating a motor vehicle, unsafe sex, sharing needles, etc.)?   Denies.       Dimension IV Risk Ratin- No Concern.   Reason Risk Rating Assigned: Client verbalizes a desire to be in treatment, and to get help in order to change. Client appears to be genuinely motivated at this time. Client was calm and cooperative throughout this intake.          Dimension V Relapse/Continued Use/Continued Problem Potential     Client age at First Treatment: 37  Lifetime # of CD Treatments:  7  List program, dates, and status of completion (within last five years): [facility] Sober living- unsuccessful, [facility]- successful, [place] [id number]- successful .     Longest Period of Abstinence: 6.5 years.   How did you  accomplish this? Co-chairing meetings, going to meetings, and going to outreach work.      Risk Taking/Problem Behaviors Related to Use: Denies.       Dimension V Risk Rating: 3- Serious Concern.   Reason Risk Rating Assigned: Client reports a number of treatments in his life, with 3 of those being in the last 5 years. This may indicate a lack of coping skills to prevent relapse outside of a controlled environment. Client reports that his longest period of sobriety was about 6.5 years, and so it appears that the client does possess some coping skills, but it is unclear how often the client applies these skills in his daily life currently. Client's anger issues may indicate an increased risk of relapse at this time as the client past use may have been a result of his inability to cope with daily stress.       Dimension VI Recovery Environment   Family support:  YES[x] / NO[]  Peer Sober Support:  YES[x] / NO []  Current living circumstances:  With a long-time friend in the friend's apartment.     Specific activities participating in which do not involve substance use:  Baseball, working with youth, tennis, dancing, working with AA and NA.     Specific activities participating in which do involve substance use:  Client reports that use was just his daily life when he was using.      Environment supportive of recovery:  YES[x] / NO[]  People, things that threaten recovery: YES[x]/NO  []  Explain:old using friends.      Expected family involvement during treatment services:  Brother possibly.   Current Legal Involvement:  None.   Legal Consequences related to use: Possession in 1997  Occupational/Academic consequences related to use: None.     Current support network for recovery (including community-based recovery support): None currently, but plans to attend 3-4 times per week.   Do you belong to a [employer]: YES[]/NO[x] Which [employer]?NA  Reside on reservation: YES[]/NO[x]    Dimension VI Risk Rating: 3- Serious Concern.    Reason Risk Rating Assigned: Client reports that he is currently living with a long-time friend, as the client is otherwise homeless. Client reports that this friend is very supportive of him, and is also sober. Client reports that his friends and family are supportive of his recovery currently, but he does state that his old using friends could be a threat to his sobriety. Client denies any current legal involvement, but does report 1 past charge for possession. Client reports no current support group participation as he was just released from inpatient on this date, but states that he plans to get involved multiple times per week. Client does endorse some hobbies that he enjoys, but it does not appear that the client has been engaging in those activities when he was using prior to treatment. Client is also unemployed, and so appears to lack structured and meaningful activity to fill his time.       DSM-V Criteria for Substance Abuse  Instructions:  Determine whether the client currently meets the criteria for a Substance Use Disorder using the diagnostic criteria in the  DSM-V, pp. 481-589. Current means during the most recent 12 months outside a facility that controls access to substances.    Category of substance Severity ICD-10 Code/DSM V Code  Alcohol Use Disorder Mild  Moderate  Severe (F10.10) (305.00)  (F10.20) (303.90)  (F10.20) (303.90)   Cannabis Use Disorder Mild  Moderate  Severe (F12.10) (305.20)  (F12.20) (304.30)  (F12.20) (304.30)   Hallucinogen Use Disorder Mild  Moderate  Severe (F16.10) (305.30)  (F16.20) (304.50)  (F16.20) (304.50)   Inhalant Use Disorder Mild  Moderate  Severe (F18.10) (305.90)  (F18.20) (304.60)  (F18.20) (304.60)   Opioid Use Disorder Mild  Moderate  Severe (F11.10) (305.50)  (F11.20) (304.00)  (F11.20) (304.00)   Sedative, Hypnotic, or Anxiolytic Use Disorder Mild  Moderate  Severe (F13.10) (305.40)  (F13.20) (304.10)  (F13.20) (304.10)   Stimulant Related Disorders  Mild            Moderate            Severe   (F15.10) (305.70) Amphetamine type substance  (F14.10) (305.60) Cocaine  (F15.10) (305.70) Other or unspecified stimulant    (F15.20) (304.40) Amphetamine type substance  (F14.20) (304.20) Cocaine  (F15.20) (304.40) Other or unspecified stimulant    (F15.20) (304.40) Amphetamine type substance  (F14.20) (304.20) Cocaine  (F15.20) (304.40) Other or unspecified stimulant   DisorderTobacco use Disorder Mild  Moderate  Severe   (Z72.0) (305.1)  (F17.200) (305.1)  (F17.200) (305.1)   Other (or unknown) Substance Use Disorder Mild  Moderate  Severe (F19.10) (305.90)  (F19.20) (304.90)  (F19.20) (304.90)     Diagnostic Impression:_Opioid Use Disorder, Severe (F11.20), Stimulant Use Disorder, Cocaine, Severe (F14.20)_____________    Assessment Completed Within 3 Sessions of Admission: YES[x]/NO[]  If NO, date assessment to be completed noted in Treatment Plan: YES[]/NO[]  Signature of Counselor:__ANGEL Espana________________________ Date and Time of Signature: ___4/20/2017, 3:02 PM________________

## 2021-08-03 PROBLEM — F11.20 SEVERE OPIOID USE DISORDER (H): Status: RESOLVED | Noted: 2017-03-30 | Resolved: 2017-05-11
